# Patient Record
Sex: MALE | Race: ASIAN | NOT HISPANIC OR LATINO | URBAN - METROPOLITAN AREA
[De-identification: names, ages, dates, MRNs, and addresses within clinical notes are randomized per-mention and may not be internally consistent; named-entity substitution may affect disease eponyms.]

---

## 2017-07-19 ENCOUNTER — OUTPATIENT (OUTPATIENT)
Dept: OUTPATIENT SERVICES | Facility: HOSPITAL | Age: 52
LOS: 1 days | End: 2017-07-19

## 2017-07-19 DIAGNOSIS — Z00.00 ENCOUNTER FOR GENERAL ADULT MEDICAL EXAMINATION WITHOUT ABNORMAL FINDINGS: ICD-10-CM

## 2017-12-17 ENCOUNTER — EMERGENCY (EMERGENCY)
Facility: HOSPITAL | Age: 52
LOS: 1 days | Discharge: HOME | End: 2017-12-17
Attending: EMERGENCY MEDICINE | Admitting: EMERGENCY MEDICINE
Payer: COMMERCIAL

## 2017-12-17 VITALS
RESPIRATION RATE: 16 BRPM | TEMPERATURE: 98 F | OXYGEN SATURATION: 100 % | SYSTOLIC BLOOD PRESSURE: 125 MMHG | DIASTOLIC BLOOD PRESSURE: 84 MMHG | HEART RATE: 80 BPM

## 2017-12-17 PROCEDURE — 99284 EMERGENCY DEPT VISIT MOD MDM: CPT

## 2017-12-17 PROCEDURE — 73030 X-RAY EXAM OF SHOULDER: CPT | Mod: 26,RT

## 2017-12-17 PROCEDURE — 73030 X-RAY EXAM OF SHOULDER: CPT

## 2017-12-17 PROCEDURE — 99283 EMERGENCY DEPT VISIT LOW MDM: CPT | Mod: 25

## 2017-12-17 RX ORDER — KETOROLAC TROMETHAMINE 30 MG/ML
10 SYRINGE (ML) INJECTION ONCE
Qty: 0 | Refills: 0 | Status: DISCONTINUED | OUTPATIENT
Start: 2017-12-17 | End: 2017-12-17

## 2017-12-17 RX ADMIN — Medication 10 MILLIGRAM(S): at 18:09

## 2017-12-17 RX ADMIN — Medication 10 MILLIGRAM(S): at 18:10

## 2017-12-17 NOTE — ED PROVIDER NOTE - PROGRESS NOTE DETAILS
Patient was evaluated by me, found in no acute distress, calm, speaking in complete sentences. X-rays reveal no acute fracture, dislocation, or bony deformity, official report not available. Recommendations for rest and follow-up with PMD were given to patient. Patient is stable for discharge. Will discharge home. I agree with the NP's assessment and plan. Dr. Damien Perez

## 2017-12-17 NOTE — ED ADULT NURSE NOTE - OBJECTIVE STATEMENT
51 y/o male A&Ox4, presents to ED with c/o worsening right shoulder pain x1 week. 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 Pt rates pain 0/10 with no movement, rates pain 5/10 with movement. Pt is otherwise well appearing, alert and ambulatory. Has no other complaints at this time.

## 2017-12-17 NOTE — ED PROVIDER NOTE - PLAN OF CARE
Take ibuprofen 600 mg every 8 hours as needed for pain with food and plenty of water  Rest, ice or heat packs as needed for discomfort.  Follow up with your primary doctor within the next 1-2 days  If you have any worsening or changing symptoms such as confusion, loss of consciousness, worsening pain, nausea/vomiting, or if you have any other concerns please return to the emergency department

## 2017-12-17 NOTE — ED PROVIDER NOTE - MEDICAL DECISION MAKING DETAILS
Dr. Perez: Male patient with atraumatic right shoulder pain, with painful ROM. X-ray ordered to assess for fracture, dislocation, bony deformity.

## 2017-12-17 NOTE — ED PROVIDER NOTE - PHYSICAL EXAMINATION
NAD, VSS, Afebrile, No spinal tender, + right shoulder laterally tender with diminished  ROMs, N/V- intact, no deformity. NAD, VSS, Afebrile, No spinal tender, + right shoulder laterally tender with diminished  ROMs, N/V- intact, no deformity.    Dr. Perez: Gen: AAOX3, in no acute distress, speaking in complete sentences, cooperative with examination  Ext: no edema or cyanosis, right shoulder pain with abduction, positive empty can test, no gross deformities, no swelling, right distal radial and ulnar pulses 2+  Neuro: no gross deficits

## 2017-12-17 NOTE — ED PROVIDER NOTE - OBJECTIVE STATEMENT
53yo male pt, ambulatory c/o right dominant shoulder pain with sudden onset since 5pm. Described pain as sharp needle pain with movement. Denies injuries. Denies sensory changes or weakness to extremities. Denies CP/SOB/ABD pain or N/V/D.

## 2017-12-17 NOTE — ED PROVIDER NOTE - CARE PLAN
Principal Discharge DX:	Shoulder pain, right Principal Discharge DX:	Shoulder pain, right  Instructions for follow-up, activity and diet:	Take ibuprofen 600 mg every 8 hours as needed for pain with food and plenty of water  Rest, ice or heat packs as needed for discomfort.  Follow up with your primary doctor within the next 1-2 days  If you have any worsening or changing symptoms such as confusion, loss of consciousness, worsening pain, nausea/vomiting, or if you have any other concerns please return to the emergency department

## 2017-12-17 NOTE — ED ADULT NURSE NOTE - CHPI ED SYMPTOMS NEG
no deformity/no fever/no numbness/no difficulty bearing weight/no weakness/no abrasion/no back pain/no tingling/no stiffness/no bruising

## 2020-04-25 ENCOUNTER — MESSAGE (OUTPATIENT)
Age: 55
End: 2020-04-25

## 2020-05-04 LAB
SARS-COV-2 IGG SERPL IA-ACNC: 0.1 INDEX
SARS-COV-2 IGG SERPL QL IA: NEGATIVE

## 2020-12-02 NOTE — ED PROVIDER NOTE - CROS ED EYES ALL NEG
Left detailed message for pt with provider recommendations.  PLEASE ASSIST WITH SCHEDULING CPE WHEN SHE CALLS BACK.   negative...

## 2021-04-24 NOTE — ED ADULT NURSE NOTE - NS ED NURSE LEVEL OF CONSCIOUSNESS AFFECT
[de-identified] : No other concerns, here to follow up results. [FreeTextEntry6] : Pt. here for f/u for abdominal pain. Had abdominal xray that showed marked amount of stool throughout colon including rectum and retrosigmoid. Findings c/w E.coli. Parents report he is having dry/hard BMS. No longer having abdominal pain.  Calm

## 2023-12-05 ENCOUNTER — APPOINTMENT (OUTPATIENT)
Dept: HEPATOLOGY | Facility: CLINIC | Age: 58
End: 2023-12-05

## 2024-01-06 DIAGNOSIS — K75.4 AUTOIMMUNE HEPATITIS: ICD-10-CM

## 2024-01-09 RX ORDER — TENOFOVIR ALAFENAMIDE 25 MG/1
25 TABLET ORAL
Qty: 30 | Refills: 11 | Status: ACTIVE | COMMUNITY
Start: 2024-01-06 | End: 1900-01-01

## 2024-01-23 ENCOUNTER — APPOINTMENT (OUTPATIENT)
Dept: HEPATOLOGY | Facility: CLINIC | Age: 59
End: 2024-01-23
Payer: COMMERCIAL

## 2024-01-23 VITALS
BODY MASS INDEX: 25.7 KG/M2 | HEART RATE: 90 BPM | SYSTOLIC BLOOD PRESSURE: 125 MMHG | HEIGHT: 68 IN | DIASTOLIC BLOOD PRESSURE: 85 MMHG | WEIGHT: 169.6 LBS

## 2024-01-23 DIAGNOSIS — K74.02 HEPATIC FIBROSIS, ADVANCED FIBROSIS: ICD-10-CM

## 2024-01-23 DIAGNOSIS — B18.1 CHRONIC VIRAL HEPATITIS B W/OUT DELTA-AGENT: ICD-10-CM

## 2024-01-23 PROCEDURE — 99203 OFFICE O/P NEW LOW 30 MIN: CPT

## 2024-01-23 NOTE — HISTORY OF PRESENT ILLNESS
[de-identified] : Chronic hepatitis B  Advanced fibrosis  FH of liver disease   Patient feels ok No new event no NVD No rectal bleeding  No chest pain or SOB  No cough No dizziness or confusion No  symptoms   ROS otherwise negative   Meds reviewed  Labs and imaging reviewed   [FreeTextEntry1] : vs stable Clear lungs with no crackle or rhonchi  Normal cardiac exam with regular heartbeats and no murmur Soft abdomen with no tenderness, ascites or hernia No edema of extremities Awake alert and oriented

## 2024-01-23 NOTE — ASSESSMENT
[FreeTextEntry1] : Patient with chronic hepatitis B and history of advanced fibrosis Continue Vemlidy.  Importance of lifelong treatment with antiviral medicine have been discussed with him in details Records to be obtained from [facility] Had colonoscopy in approximately 6 years ago.  Colorectal cancer screening was discussed with him HCC screening was discussed with him and ultrasound was ordered Follow-up in 6-month

## 2024-01-25 LAB
ALBUMIN SERPL ELPH-MCNC: 4.7 G/DL
ALP BLD-CCNC: 69 U/L
ALT SERPL-CCNC: 21 U/L
ANION GAP SERPL CALC-SCNC: 14 MMOL/L
AST SERPL-CCNC: 17 U/L
BILIRUB SERPL-MCNC: 0.5 MG/DL
BUN SERPL-MCNC: 16 MG/DL
CALCIUM SERPL-MCNC: 9.8 MG/DL
CHLORIDE SERPL-SCNC: 102 MMOL/L
CO2 SERPL-SCNC: 25 MMOL/L
CREAT SERPL-MCNC: 0.81 MG/DL
EGFR: 101 ML/MIN/1.73M2
GLUCOSE SERPL-MCNC: 117 MG/DL
HBV DNA # SERPL NAA+PROBE: 20 IU/ML
HCT VFR BLD CALC: 51.1 %
HEPATITIS A IGG ANTIBODY: REACTIVE
HEPB DNA PCR INT: DETECTED
HEPB DNA PCR LOG: 1.29 LOGIU/ML
HGB BLD-MCNC: 16.5 G/DL
MCHC RBC-ENTMCNC: 29 PG
MCHC RBC-ENTMCNC: 32.3 GM/DL
MCV RBC AUTO: 89.8 FL
PLATELET # BLD AUTO: 307 K/UL
POTASSIUM SERPL-SCNC: 4.8 MMOL/L
PROT SERPL-MCNC: 7.2 G/DL
RBC # BLD: 5.69 M/UL
RBC # FLD: 12.7 %
SODIUM SERPL-SCNC: 141 MMOL/L
WBC # FLD AUTO: 8.05 K/UL

## 2024-02-01 ENCOUNTER — RESULT REVIEW (OUTPATIENT)
Age: 59
End: 2024-02-01

## 2024-02-05 LAB
ALPHA-1-FETOPROTEIN-L3: NORMAL %
ALPHA-1-FETOPROTEIN: 3.2 NG/ML

## 2025-01-13 PROBLEM — R63.4 WEIGHT LOSS: Status: ACTIVE | Noted: 2025-01-13

## 2025-02-11 ENCOUNTER — APPOINTMENT (OUTPATIENT)
Dept: HEPATOLOGY | Facility: CLINIC | Age: 60
End: 2025-02-11

## 2025-02-11 VITALS
HEIGHT: 68 IN | WEIGHT: 160 LBS | BODY MASS INDEX: 24.25 KG/M2 | SYSTOLIC BLOOD PRESSURE: 117 MMHG | HEART RATE: 81 BPM | DIASTOLIC BLOOD PRESSURE: 78 MMHG

## 2025-02-11 DIAGNOSIS — R63.4 ABNORMAL WEIGHT LOSS: ICD-10-CM

## 2025-02-11 DIAGNOSIS — B18.1 CHRONIC VIRAL HEPATITIS B W/OUT DELTA-AGENT: ICD-10-CM

## 2025-02-11 DIAGNOSIS — K74.02 HEPATIC FIBROSIS, ADVANCED FIBROSIS: ICD-10-CM

## 2025-02-11 PROCEDURE — 99214 OFFICE O/P EST MOD 30 MIN: CPT

## 2025-02-11 PROCEDURE — G2211 COMPLEX E/M VISIT ADD ON: CPT

## 2025-02-22 RX ORDER — SODIUM SULFATE, POTASSIUM SULFATE AND MAGNESIUM SULFATE 1.6; 3.13; 17.5 G/177ML; G/177ML; G/177ML
17.5-3.13-1.6 SOLUTION ORAL
Qty: 2 | Refills: 0 | Status: ACTIVE | COMMUNITY
Start: 2025-02-22 | End: 1900-01-01

## 2025-03-05 ENCOUNTER — RESULT REVIEW (OUTPATIENT)
Age: 60
End: 2025-03-05

## 2025-03-14 ENCOUNTER — NON-APPOINTMENT (OUTPATIENT)
Age: 60
End: 2025-03-14

## 2025-03-17 ENCOUNTER — NON-APPOINTMENT (OUTPATIENT)
Age: 60
End: 2025-03-17

## 2025-03-26 ENCOUNTER — APPOINTMENT (OUTPATIENT)
Dept: HEPATOLOGY | Facility: HOSPITAL | Age: 60
End: 2025-03-26

## 2025-03-26 ENCOUNTER — OUTPATIENT (OUTPATIENT)
Dept: OUTPATIENT SERVICES | Facility: HOSPITAL | Age: 60
LOS: 1 days | Discharge: HOME | End: 2025-03-26
Payer: COMMERCIAL

## 2025-03-26 ENCOUNTER — RESULT REVIEW (OUTPATIENT)
Age: 60
End: 2025-03-26

## 2025-03-26 PROCEDURE — 43239 EGD BIOPSY SINGLE/MULTIPLE: CPT

## 2025-03-26 PROCEDURE — 45378 DIAGNOSTIC COLONOSCOPY: CPT

## 2025-03-26 PROCEDURE — 88305 TISSUE EXAM BY PATHOLOGIST: CPT

## 2025-03-26 PROCEDURE — G0121: CPT

## 2025-03-26 PROCEDURE — 88305 TISSUE EXAM BY PATHOLOGIST: CPT | Mod: 26

## 2025-04-03 ENCOUNTER — NON-APPOINTMENT (OUTPATIENT)
Age: 60
End: 2025-04-03

## 2025-05-06 ENCOUNTER — INPATIENT (INPATIENT)
Facility: HOSPITAL | Age: 60
LOS: 0 days | Discharge: HOME | DRG: 123 | End: 2025-05-07
Attending: INTERNAL MEDICINE | Admitting: INTERNAL MEDICINE
Payer: COMMERCIAL

## 2025-05-06 VITALS
SYSTOLIC BLOOD PRESSURE: 128 MMHG | HEART RATE: 88 BPM | DIASTOLIC BLOOD PRESSURE: 89 MMHG | RESPIRATION RATE: 19 BRPM | OXYGEN SATURATION: 100 %

## 2025-05-06 DIAGNOSIS — H53.2 DIPLOPIA: ICD-10-CM

## 2025-05-06 LAB
ALBUMIN SERPL ELPH-MCNC: 4.2 G/DL — SIGNIFICANT CHANGE UP (ref 3.3–5)
ALP SERPL-CCNC: 66 U/L — SIGNIFICANT CHANGE UP (ref 40–120)
ALT FLD-CCNC: 21 U/L — SIGNIFICANT CHANGE UP (ref 10–45)
ANION GAP SERPL CALC-SCNC: 12 MMOL/L — SIGNIFICANT CHANGE UP (ref 5–17)
APTT BLD: 33.3 SEC — SIGNIFICANT CHANGE UP (ref 26.1–36.8)
AST SERPL-CCNC: 17 U/L — SIGNIFICANT CHANGE UP (ref 10–40)
BASOPHILS # BLD AUTO: 0.05 K/UL — SIGNIFICANT CHANGE UP (ref 0–0.2)
BASOPHILS NFR BLD AUTO: 0.5 % — SIGNIFICANT CHANGE UP (ref 0–2)
BILIRUB SERPL-MCNC: 0.7 MG/DL — SIGNIFICANT CHANGE UP (ref 0.2–1.2)
BUN SERPL-MCNC: 11 MG/DL — SIGNIFICANT CHANGE UP (ref 7–23)
CALCIUM SERPL-MCNC: 9.2 MG/DL — SIGNIFICANT CHANGE UP (ref 8.4–10.5)
CHLORIDE SERPL-SCNC: 100 MMOL/L — SIGNIFICANT CHANGE UP (ref 96–108)
CO2 SERPL-SCNC: 25 MMOL/L — SIGNIFICANT CHANGE UP (ref 22–31)
CREAT SERPL-MCNC: 0.78 MG/DL — SIGNIFICANT CHANGE UP (ref 0.5–1.3)
EGFR: 101 ML/MIN/1.73M2 — SIGNIFICANT CHANGE UP
EGFR: 101 ML/MIN/1.73M2 — SIGNIFICANT CHANGE UP
EOSINOPHIL # BLD AUTO: 0.12 K/UL — SIGNIFICANT CHANGE UP (ref 0–0.5)
EOSINOPHIL NFR BLD AUTO: 1.3 % — SIGNIFICANT CHANGE UP (ref 0–6)
GAS PNL BLDV: SIGNIFICANT CHANGE UP
GLUCOSE SERPL-MCNC: 170 MG/DL — HIGH (ref 70–99)
HCT VFR BLD CALC: 49 % — SIGNIFICANT CHANGE UP (ref 39–50)
HGB BLD-MCNC: 16.5 G/DL — SIGNIFICANT CHANGE UP (ref 13–17)
IMM GRANULOCYTES NFR BLD AUTO: 0.7 % — SIGNIFICANT CHANGE UP (ref 0–0.9)
INR BLD: 1.07 RATIO — SIGNIFICANT CHANGE UP (ref 0.85–1.16)
LYMPHOCYTES # BLD AUTO: 2.46 K/UL — SIGNIFICANT CHANGE UP (ref 1–3.3)
LYMPHOCYTES # BLD AUTO: 26.8 % — SIGNIFICANT CHANGE UP (ref 13–44)
MCHC RBC-ENTMCNC: 29.6 PG — SIGNIFICANT CHANGE UP (ref 27–34)
MCHC RBC-ENTMCNC: 33.7 G/DL — SIGNIFICANT CHANGE UP (ref 32–36)
MCV RBC AUTO: 88 FL — SIGNIFICANT CHANGE UP (ref 80–100)
MONOCYTES # BLD AUTO: 0.61 K/UL — SIGNIFICANT CHANGE UP (ref 0–0.9)
MONOCYTES NFR BLD AUTO: 6.6 % — SIGNIFICANT CHANGE UP (ref 2–14)
NEUTROPHILS # BLD AUTO: 5.88 K/UL — SIGNIFICANT CHANGE UP (ref 1.8–7.4)
NEUTROPHILS NFR BLD AUTO: 64.1 % — SIGNIFICANT CHANGE UP (ref 43–77)
NRBC BLD AUTO-RTO: 0 /100 WBCS — SIGNIFICANT CHANGE UP (ref 0–0)
PLATELET # BLD AUTO: 257 K/UL — SIGNIFICANT CHANGE UP (ref 150–400)
POTASSIUM SERPL-MCNC: 3.7 MMOL/L — SIGNIFICANT CHANGE UP (ref 3.5–5.3)
POTASSIUM SERPL-SCNC: 3.7 MMOL/L — SIGNIFICANT CHANGE UP (ref 3.5–5.3)
PROT SERPL-MCNC: 7.2 G/DL — SIGNIFICANT CHANGE UP (ref 6–8.3)
PROTHROM AB SERPL-ACNC: 12.2 SEC — SIGNIFICANT CHANGE UP (ref 9.9–13.4)
RBC # BLD: 5.57 M/UL — SIGNIFICANT CHANGE UP (ref 4.2–5.8)
RBC # FLD: 12.2 % — SIGNIFICANT CHANGE UP (ref 10.3–14.5)
SODIUM SERPL-SCNC: 137 MMOL/L — SIGNIFICANT CHANGE UP (ref 135–145)
TROPONIN T, HIGH SENSITIVITY RESULT: <6 NG/L — SIGNIFICANT CHANGE UP (ref 0–51)
WBC # BLD: 9.18 K/UL — SIGNIFICANT CHANGE UP (ref 3.8–10.5)
WBC # FLD AUTO: 9.18 K/UL — SIGNIFICANT CHANGE UP (ref 3.8–10.5)

## 2025-05-06 PROCEDURE — 70450 CT HEAD/BRAIN W/O DYE: CPT | Mod: 26,59

## 2025-05-06 PROCEDURE — 99291 CRITICAL CARE FIRST HOUR: CPT | Mod: GC

## 2025-05-06 PROCEDURE — 70496 CT ANGIOGRAPHY HEAD: CPT | Mod: 26

## 2025-05-06 PROCEDURE — 99285 EMERGENCY DEPT VISIT HI MDM: CPT

## 2025-05-06 PROCEDURE — 70498 CT ANGIOGRAPHY NECK: CPT | Mod: 26

## 2025-05-06 PROCEDURE — 93010 ELECTROCARDIOGRAM REPORT: CPT

## 2025-05-06 NOTE — H&P ADULT - ASSESSMENT
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    ====Neurological====  #Diplopia  #CN IV palsy  -Intermittent/fluctuating diplopia x 1 week, improves with covering one eye, no pain. NIHSS 1 in ED for CN IV palsy on neuro exam.   -CTA H/N unremarkable: No flow limiting stenosis, no aneurysm identified.  Plan: MRI brain w and wo contrast to rule out CNS etiology             MRI orbits w and wo contrast to look for local pathology              TTE with bubble study to eval for intracardiac shunt                               ====Respiratory====  -Saturating well on RA  No active issues.    ====Cardiovascular====  -Trop negative, EKG: sinus rhythm, qtc wnl   No active issues.    ====Renal====  -Cr 0.78  No active issues.    ====GI====  #Chronic Hepatitis B  -Home med: Vemlidy 25mg  -LFT's wnl  Plan: c/w home Vemlidy    ====Endocrine====  -Glucose wnl  Plan: monitor glucose on BMP    ====Heme====  -CBC wnl   Plan: DVT ppx Lovenox 40mg Sq daily             Monitor CBC    ====ID====  -Afebrile, VSS, WBC wnl, no concerns for active infection.     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    ====Neurological====  #Diplopia  #CN IV palsy  -Intermittent/fluctuating diplopia x 1 week, improves with covering one eye, no pain. NIHSS 1 in ED for CN IV palsy on neuro exam.   -CTA H/N unremarkable: No flow limiting stenosis, no aneurysm identified.  -Passed bedside dysphagia screen  Plan: MRI brain w and wo contrast, consider antithrombotics based on MR results             MRI orbits w and wo contrast             TTE with bubble study to eval for intracardiac shunt             PT/OT             Eye patch                      ====Respiratory====  -Saturating well on RA  No active issues.    ====Cardiovascular====  -Trop negative, EKG: sinus rhythm, QTc normal  Plan: BP goal <140/90             f/u Lipid panel    ====Renal====  -Cr 0.78  No active issues.    ====GI====  #Chronic Hepatitis B  -Home med: Vemlidy 25mg  -LFT's wnl  Plan: c/w home Vemlidy    ====Endocrine====  -Glucose wnl  Plan: monitor glucose on BMP             Goal 140-180 inpatient    ====Heme====  -CBC wnl   Plan: DVT ppx Lovenox 40mg Sq daily             Monitor CBC    ====ID====  -Afebrile, VSS, WBC wnl, no concerns for active infection.     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    ====Neurological====  #Diplopia  #CN IV palsy  -Intermittent/fluctuating diplopia x 1 week, improves with covering one eye, no pain. NIHSS 1 in ED for CN IV palsy on neuro exam.   -CTA H/N unremarkable: No flow limiting stenosis, no aneurysm identified.  -Passed bedside dysphagia screen  Plan: MRI brain w and wo contrast, consider antithrombotics based on MR results             MRI orbits w and wo contrast             TTE with bubble study to eval for intracardiac shunt             PT/OT             Eye patch                      ====Respiratory====  -Saturating well on RA  No active issues.    ====Cardiovascular====  -Trop negative, EKG: sinus rhythm, QTc normal  Plan: BP goal <140/90             f/u Lipid panel    ====Renal====  -Cr 0.78  No active issues.    ====GI====  #Chronic Hepatitis B  -Home med: Vemlidy 25mg  -LFT's wnl  Plan: c/w home Vemlidy    ====Endocrine====  -Glucose wnl  Plan: monitor glucose on BMP             Goal 140-180 inpatient             f/u Hb A1c    ====Heme====  -CBC wnl   Plan: DVT ppx Lovenox 40mg Sq daily             Monitor CBC    ====ID====  -Afebrile, VSS, WBC wnl, no concerns for active infection.     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    ====Neurological====  #Diplopia  #CN IV palsy  -Intermittent/fluctuating diplopia x 1 week, improves with covering one eye, no pain. NIHSS 1 in ED for CN IV palsy on neuro exam.   -CTA H/N unremarkable: No flow limiting stenosis, no aneurysm identified.  -Passed bedside dysphagia screen  Plan: MRI brain w and wo contrast, consider antithrombotics based on MR results             MRI orbits w and wo contrast             TTE with bubble study to eval for intracardiac shunt             PT/OT             Eye patch                      ====Respiratory====  -Saturating well on RA  No active issues.    ====Cardiovascular====  -Trop negative, EKG: sinus rhythm, QTc normal  Plan: BP goal <140/90             f/u Lipid panel    ====Renal====  -Cr 0.78  No active issues.    ====GI====  #Chronic Hepatitis B  -Home med: Vemlidy 25mg  -LFT's wnl, prior screening MRI's negative for any suspicious lesions.  Plan: c/w home Vemlidy    ====Endocrine====  -Glucose wnl  Plan: monitor glucose on BMP             Goal 140-180 inpatient             f/u Hb A1c    ====Heme====  -CBC wnl   Plan: DVT ppx Lovenox 40mg Sq daily             Monitor CBC    ====ID====  -Afebrile, VSS, WBC wnl, no concerns for active infection.     ====Lines====  -PIV    ====GOC====  -Full Code

## 2025-05-06 NOTE — ED ADULT NURSE NOTE - OBJECTIVE STATEMENT
59 y.o male c/o double vision and dizziness. Endorses intermittent double vision x 1 week, vision corrects only when one eye is covered. States dizziness and double vision worsened tonight over the past 1hr. Denies HA, room spinning sensation, NV, CP, SOB, numbness/weakness at this time. PMH chronic hepatitis B

## 2025-05-06 NOTE — CONSULT NOTE ADULT - ASSESSMENT
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 Double vision is with both eyes open, resolves with either eyes closed. Sees images one above the other. No blurry vision. 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 He denies any speech changes, numbness or weakness. He denies any headache, eye pain but has mild neck strain for last 2 hours. He denies any room spinning dizziness. Denies any head or neck trauma. On exam, VSS, right eye hypertropic, diplopia worsens on left gaze and right head tilt (RSO paresis), gait unsteady with both eyes open, normal with one eye closed, rest of the exam unremarkable.     LKW 1 week ago  MRS 0  NIHSS 1(mild gaze paresis)  out of window for TNK, thrombectomy    Impression: Binocular diplopia w possible R superior oblique paresis could be R CN 4 palsy. Etiology could be microvascular vs local compression    Recs:    [] MRI brain w and wo contrast to rule out CNS etiology  [] MRI orbits w and wo contrast to look for local pathology   [] TTE with bubble study and telemetry  [] HbA1C and Lipid Panel  [] would consider antithrombotics based on MRI    Other:  [] Telemonitoring;  Neurochecks and Vital signs q4h  [] BP goal <140/90  [] BGM goals 140-180    [] NPO until clears Dysphagia screen, otherwise Swallow evaluation  [] DVT prophylaxis  [] PT/OT evaluation  [] Eye patch    [] Stroke education provided    Case discussed w stroke fellow Dr. Fountain under the supervision of Dr. Walker a

## 2025-05-06 NOTE — ED PROVIDER NOTE - CLINICAL SUMMARY MEDICAL DECISION MAKING FREE TEXT BOX
Ganesh saw her for 59-year-old male history of chronic hepatitis B presenting for diplopia and dizziness.  Patient states intermittent double vision for 1 week acutely worsening tonight.  States sensation of diplopia.  States vision is only corrected if he covers 1 eye.  States he feels unsteady.  Denies sensation of room spinning, nausea and vomiting.  Denies any syncope.  Denies blood thinner use.  Denies fevers chills chest pain shortness of breath nausea vomiting diarrhea.  No prior history of stroke or TIA.    Chencho Freeman DO (PGY3)   Physical Exam:    Gen: NAD, AOx3  Head: NCAT  HEENT: EOMI, PEERLA  Lung: CTAB  CV: RRR  Abd: soft, NT, ND, no guarding, no rigidity, no rebound tenderness, no CVA tenderness   MSK: no visible deformities, ROM normal in UE/LE, no midline ttp to entire spine, pelvis stable  Neuro: No focal sensory or motor deficits. Sensation intact to light touch all extremities.  Skin: Warm, well perfused, no rash, no leg swelling  Psych: normal affect    Vital signs stable, afebrile, not hypoxic. Code stroke activated for diplopia and unsteadiness.  Plan for labs, CT imaging, neurology evaluation  Differential diagnosis includes but not limited to TIA versus LVO versus infectious etiology rest metabolic derangement.  Final dispo pending labs imaging close reassessment. 59-year-old male history of chronic hepatitis B presenting for diplopia and dizziness.  Patient states intermittent double vision for 1 week acutely worsening tonight approx 1 hour ago.  States sensation of diplopia that is only corrected if he covers 1 eye.  States he feels unsteady.  Denies sensation of room spinning, nausea and vomiting.  Denies any syncope.  Denies blood thinner use.  Denies fevers chills chest pain shortness of breath nausea vomiting diarrhea.  No prior history of stroke or TIA.     Chencho Freeman DO (PGY3)   Physical Exam:    Gen: NAD, AOx3  Head: NCAT  HEENT: EOMI, PEERLA  Lung: CTAB  CV: RRR  Abd: soft, NT, ND, no guarding, no rigidity, no rebound tenderness, no CVA tenderness   MSK: no visible deformities, ROM normal in UE/LE, no midline ttp to entire spine, pelvis stable  Neuro: No focal sensory or motor deficits. Sensation intact to light touch all extremities.   Skin: Warm, well perfused, no rash, no leg swelling  Psych: normal affect    Vital signs stable, afebrile, not hypoxic. Code stroke activated for diplopia and unsteadiness.  Plan for labs, CT imaging, neurology evaluation  Differential diagnosis includes but not limited to TIA versus LVO versus infectious etiology versus metabolic derangement.  Final dispo pending labs imaging close reassessment.

## 2025-05-06 NOTE — STROKE CODE NOTE - NIH STROKE SCALE: 4. FACIAL PALSY, QM
Patient presents in office today as new patient.   Patient concerns:  Gym injury from lifting weights 11/30/2019 (0) Normal symmetrical movements

## 2025-05-06 NOTE — H&P ADULT - NSHPLABSRESULTS_GEN_ALL_CORE
LABS: When present labs, imaging, and ECG were personally reviewed                          16.5   9.18  )-----------( 257      ( 06 May 2025 21:14 )             49.0       05-06    137  |  100  |  11  ----------------------------<  170[H]  3.7   |  25  |  0.78    Ca    9.2      06 May 2025 21:14    TPro  7.2  /  Alb  4.2  /  TBili  0.7  /  DBili  x   /  AST  17  /  ALT  21  /  AlkPhos  66  05-06       LIVER FUNCTIONS - ( 06 May 2025 21:14 )  Alb: 4.2 g/dL / Pro: 7.2 g/dL / ALK PHOS: 66 U/L / ALT: 21 U/L / AST: 17 U/L / GGT: x                    Urinalysis Basic - ( 06 May 2025 21:14 )    Color: x / Appearance: x / SG: x / pH: x  Gluc: 170 mg/dL / Ketone: x  / Bili: x / Urobili: x   Blood: x / Protein: x / Nitrite: x   Leuk Esterase: x / RBC: x / WBC x   Sq Epi: x / Non Sq Epi: x / Bacteria: x        PT/INR - ( 06 May 2025 21:14 )   PT: 12.2 sec;   INR: 1.07 ratio         PTT - ( 06 May 2025 21:14 )  PTT:33.3 sec    Lactate Trend            CAPILLARY BLOOD GLUCOSE                RADIOLOGY & ADDITIONAL TESTS:

## 2025-05-06 NOTE — H&P ADULT - ATTENDING COMMENTS
Attending Attestation:    Patient seen and examined with resident/fellow.  Agree with above except as noted.     58 yo male with h/o chronic hepatitis B  on Vemlidy , presents with worsening diplopia this evening. Pt has had worsening symptoms of diplopia for 7y-10 days.  Pt could not see and was unstable standing.  Pt has been c/o neck pain because of moving his neck in better position to see normally. When patient closes L eye he has no diplopia. . No nausea or vomiting. Pt denies any other symptoms such as leg or arm weakness. No dysarthria.      PE. Pt lying in bed in NAD /92  HEENT sclera anicteric   Neck: No JVD  Lungs clear to a   COr s1s2 wnl  ABD soft non tender  ext: -cce  Neuro: no eye weakness on my exam. Non focal exam. Good arm and leg strength.    CT head without signs of bleeding or stroke.  CTA head Neck. WNL    A/P59 yo male with chronic hepatitis B with severe diplopia.  Central CNS defect vs  specific opthalmic defect.  1. MRI head  2MRI orbit  3. Follow neuro exam closely  4Continue Vemlidy for chronic hepatitis B  5. Lovenox for DVT prophylaxis  GOC; Full code. Attending Attestation:    Patient seen and examined with resident/fellow.  Agree with above except as noted.     60 yo male with h/o chronic hepatitis B  on Vemlidy , presents with worsening diplopia this evening. Pt has had worsening symptoms of diplopia for 7-10 days.  Pt could not see and was unstable standing.  Pt has been c/o neck pain because of moving his neck in better position to see normally. When patient closes L eye he has no diplopia. . No nausea or vomiting. Pt denies any other symptoms such as leg or arm weakness. No dysarthria.      PE. Pt lying in bed in NAD /92  HEENT sclera anicteric   Neck: No JVD  Lungs clear to a   COr s1s2 wnl  ABD soft non tender  ext: -cce  Neuro: no eye weakness on my exam. Non focal exam. Good arm and leg strength.    CT head without signs of bleeding or stroke.  CTA head Neck. WNL    A/P59 yo male with chronic hepatitis B with severe diplopia.  Central CNS defect vs  specific opthalmic defect.  1. MRI head  2MRI orbit  3. Follow neuro exam closely  4Continue Vemlidy for chronic hepatitis B  5. Lovenox for DVT prophylaxis  6.GOC; Full code.  7. L eye patch

## 2025-05-06 NOTE — ED ADULT NURSE NOTE - NSFALLRISKINTERV_ED_ALL_ED

## 2025-05-06 NOTE — H&P ADULT - HISTORY OF PRESENT ILLNESS
58 y/o M PMH chronic Hep B, presented to [facility] c/o intermittent diplopia x 1 week, worsening tonight x 2 hours associated with feeling unsteady with walking and difficult focusing on computer screen. His double vision resolves with closing one eye, described as seeing images one above the other. He denies any speech changes, numbness, weakness, headache, eye pain, or vertigo.     In the ED code stroke was activated, he was NIHSS 1 for R eye gaze paresis noted on exam, suspected CN IV palsy, CTA H/N unremarkable.      60 y/o M PMH chronic Hep B, presented to [facility] c/o intermittent diplopia x 1 week, worsening tonight x 2 hours associated with feeling unsteady with walking and difficult focusing on computer screen. His double vision resolves with closing one eye, described as seeing images one above the other. He denies any fever, speech changes, numbness, weakness, headache, eye pain, tick bites, rashes, vomiting, or vertigo.     In the ED code stroke was activated, he was NIHSS 1 for R eye gaze paresis noted on exam, suspected CN IV palsy, CTA H/N unremarkable.

## 2025-05-06 NOTE — CONSULT NOTE ADULT - SUBJECTIVE AND OBJECTIVE BOX
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 Double vision is with both eyes open, resolves with either eyes closed. Sees images one above the other. No blurry vision. 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 He denies any speech changes, numbness or weakness. He denies any headache, eye pain but has mild neck strain for last 2 hours. He denies any room spinning dizziness.     PAST MEDICAL & SURGICAL HISTORY:      FAMILY HISTORY:      SOCIAL HISTORY:  Smoking Cessation:    MEDICATIONS  (STANDING):    MEDICATIONS  (PRN):      Allergies    No Known Allergies    Intolerances        --------------------------------------------------------------------------------------------------------------------------------------------------------------------------------------------------------------------    Vital Signs Last 24 Hrs  T(C): 36.8 (06 May 2025 21:30), Max: 36.8 (06 May 2025 21:30)  T(F): 98.2 (06 May 2025 21:30), Max: 98.2 (06 May 2025 21:30)  HR: 90 (06 May 2025 21:30) (88 - 90)  BP: 147/103 (06 May 2025 21:30) (128/89 - 147/103)  BP(mean): --  RR: 19 (06 May 2025 21:30) (19 - 19)  SpO2: 98% (06 May 2025 21:30) (98% - 100%)    Parameters below as of 06 May 2025 21:30  Patient On (Oxygen Delivery Method): room air        Fingerstick Blood Glucose: CAPILLARY BLOOD GLUCOSE          PHYSICAL EXAM:     General - NAD  Cardiovascular - Peripheral pulses palpable, no edema    NEURO:    Mental status - Awake, Alert, Oriented to person, place, and time. Speech fluent, repetition and naming intact. Follows simple and complex commands.    Cranial nerves -   PERRL, VFF,    right eye hypertropic  diplopia worsens on looking to left and tilting head to right  face sensation (V1-V3) intact b/l,   facial strength intact without asymmetry b/l,   hearing intact b/l,   palate with symmetric elevation,   trapezius 5/5 strength b/l,   tongue midline on protrusion with full lateral movement    Motor - Normal bulk and tone throughout. No pronator drift.  Strength testing            Deltoid      Biceps      Triceps     Wrist Extension    Wrist Flexion     Interossei         R            5                 5               5                     5                              5                        5                 5  L             5                 5               5                     5                              5                        5                 5              Hip Flexion    Hip Extension    Knee Flexion    Knee Extension    Dorsiflexion    Plantar Flexion  R              5                           5                       5                           5                            5                          5  L              5                           5                        5                           5                            5                          5    Sensation - Light touch intact throughout    DTR's -             Biceps      Triceps     Brachioradialis      Patellar    Ankle    Toes/plantar response  R             2+             2+                  2+                       2+            2+                 Down  L              2+             2+                 2+                        2+           2+                 Down    Coordination - Finger to Nose intact b/l. Heel to Rowe intact b/l. No tremors appreciated    Gait and station - Normal casual gait w one ye closed. Stumbles either side with both eyes open.    ---------------------------------------------------------------------------------------------------------------------------------------------------------------------------------------------------------------------------    Labs:                        16.5   9.18  )-----------( 257      ( 06 May 2025 21:14 )             49.0     05-06    137  |  100  |  11  ----------------------------<  170[H]  3.7   |  25  |  0.78    Ca    9.2      06 May 2025 21:14    TPro  7.2  /  Alb  4.2  /  TBili  0.7  /  DBili  x   /  AST  17  /  ALT  21  /  AlkPhos  66  05-06    CAPILLARY BLOOD GLUCOSE        LIVER FUNCTIONS - ( 06 May 2025 21:14 )  Alb: 4.2 g/dL / Pro: 7.2 g/dL / ALK PHOS: 66 U/L / ALT: 21 U/L / AST: 17 U/L / GGT: x             PT/INR - ( 06 May 2025 21:14 )   PT: 12.2 sec;   INR: 1.07 ratio         PTT - ( 06 May 2025 21:14 )  PTT:33.3 sec  CSF:                  Radiology:  < from: CT Brain Stroke Protocol (05.06.25 @ 21:27) >  IMPRESSION:  No acute intracranial hemorrhage, mass effect, or evidence of acute   vascular territorial infarction. If clinical symptoms persist or worsen,   more sensitiveevaluation with brain MRI may be obtained, if no   contraindications exist.    < end of copied text >    < from: CT Angio Brain Stroke Protocol  w/ IV Cont (05.06.25 @ 21:28) >  IMPRESSION:    CTA NECK:  No evidence of significant stenosis or occlusion.    CTA HEAD:  Patent intracranial circulation without flow limiting stenosis.  No evidence of aneurysm. Tiny aneurysms can be beyond the resolution of   CTA technique.    < end of copied text >

## 2025-05-06 NOTE — ED PROVIDER NOTE - PROGRESS NOTE DETAILS
Sandhya Burk MD, PGY3:  pt signed out to me pending neuro recs and admission. neuro recommends MRI for further eval, pt consents to admission

## 2025-05-06 NOTE — H&P ADULT - NSHPPHYSICALEXAM_GEN_ALL_CORE
T(C): 36.7 (05-06-25 @ 22:30), Max: 36.8 (05-06-25 @ 21:30)  HR: 75 (05-06-25 @ 22:30) (75 - 90)  BP: 132/94 (05-06-25 @ 22:30) (128/89 - 147/103)  RR: 17 (05-06-25 @ 22:30) (17 - 19)  SpO2: 98% (05-06-25 @ 22:30) (98% - 100%)    GENERAL: patient appears well, no acute distress, appropriate, pleasant  EYES: sclera clear, no exudates, pupils PERRL, EOM intact.   ENMT: oropharynx clear without erythema, no exudates, moist mucous membranes  NECK: supple, soft, no thyromegaly noted  LUNGS: good air entry bilaterally, clear to auscultation, symmetric breath sounds, no wheezing or rhonchi appreciated  HEART: soft S1/S2, regular rate and rhythm, no murmurs noted, no lower extremity edema  GASTROINTESTINAL: abdomen is soft, nontender, nondistended, normoactive bowel sounds, no palpable masses  INTEGUMENT: good skin turgor, no lesions noted  MUSCULOSKELETAL: no clubbing or cyanosis, no obvious deformity  NEUROLOGIC: awake, alert, oriented x3, good muscle tone in 4 extremities, 5/5 strength UE/LE bilaterally, sensation intact throughout. Gait normal. Finger to Nose intact. T(C): 36.7 (05-06-25 @ 22:30), Max: 36.8 (05-06-25 @ 21:30)  HR: 75 (05-06-25 @ 22:30) (75 - 90)  BP: 132/94 (05-06-25 @ 22:30) (128/89 - 147/103)  RR: 17 (05-06-25 @ 22:30) (17 - 19)  SpO2: 98% (05-06-25 @ 22:30) (98% - 100%)    GENERAL: patient appears well, no acute distress, appropriate, pleasant  EYES: sclera clear, no exudates, pupils PERRL, EOM intact.   ENMT: oropharynx clear without erythema, no exudates, moist mucous membranes  NECK: supple, soft, no JVD noted  LUNGS: good air entry bilaterally, clear to auscultation, symmetric breath sounds, no wheezing or rhonchi appreciated  HEART: S1/S2, regular rate and rhythm, no murmurs noted, no lower extremity edema  GASTROINTESTINAL: abdomen is soft, nontender, nondistended, normoactive bowel sounds, no palpable masses  INTEGUMENT: good skin turgor, no lesions noted  MUSCULOSKELETAL: no clubbing or cyanosis, no obvious deformity  NEUROLOGIC: awake, alert, oriented x3, good muscle tone in 4 extremities, 5/5 strength UE/LE bilaterally, sensation intact throughout. Finger to Nose intact.

## 2025-05-06 NOTE — ED PROVIDER NOTE - OBJECTIVE STATEMENT
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  No fever or chills.  No headache.  Code stroke initiated when patient presented to the emergency department.

## 2025-05-06 NOTE — ED PROVIDER NOTE - IV ALTEPLASE EXCL REL HIDDEN
Anesthesia Post-op Note    Patient: Britton Mendosa  Procedure(s) Performed: BILATERALLOWER LEG DEBRIDEMENT - BILATERAL  Anesthesia type: General    Vitals Value Taken Time   Temp 36.5 06/20/23 1004   Pulse 78 06/20/23 1002   Resp 16 06/20/23 1004   SpO2 100 % 06/20/23 1002   /68 06/20/23 1002   Vitals shown include unvalidated device data.      Patient Location: PACU Phase 1  Post-op Vital Signs:stable  Level of Consciousness: awake, participates in exam and return to baseline  Respiratory Status: spontaneous ventilation, face mask and unassisted  Cardiovascular blood pressure returned to baseline  Hydration: euvolemic  Pain Management: well controlled  Handoff: Handoff to receiving clinician was performed and questions were answered  Vomiting: none  Nausea: None  Airway Patency:patent  Post-op Assessment: awake, alert, appropriately conversant, or baseline, no complications, patient tolerated procedure well, no evidence of recall and dentition within defined limits  Comments: Handoff given to RN. JOSE MANUEL.      There were no known notable events for this encounter.  
show

## 2025-05-06 NOTE — H&P ADULT - NSHPREVIEWOFSYSTEMS_GEN_ALL_CORE
REVIEW OF SYSTEMS:    CONSTITUTIONAL: No weakness, fevers or chills  NECK: No pain or stiffness  RESPIRATORY: No cough, wheezing, hemoptysis; No shortness of breath  CARDIOVASCULAR: No chest pain or palpitations  GASTROINTESTINAL: No abdominal or epigastric pain. No nausea, vomiting, or hematemesis; No diarrhea or constipation. No melena or hematochezia.  GENITOURINARY: No dysuria, frequency or hematuria  SKIN: No itching, rashes

## 2025-05-07 ENCOUNTER — TRANSCRIPTION ENCOUNTER (OUTPATIENT)
Age: 60
End: 2025-05-07

## 2025-05-07 VITALS
HEART RATE: 74 BPM | RESPIRATION RATE: 20 BRPM | DIASTOLIC BLOOD PRESSURE: 80 MMHG | OXYGEN SATURATION: 95 % | SYSTOLIC BLOOD PRESSURE: 110 MMHG

## 2025-05-07 LAB
A1C WITH ESTIMATED AVERAGE GLUCOSE RESULT: 5.8 % — HIGH (ref 4–5.6)
ESTIMATED AVERAGE GLUCOSE: 120 MG/DL — HIGH (ref 68–114)

## 2025-05-07 PROCEDURE — 84132 ASSAY OF SERUM POTASSIUM: CPT

## 2025-05-07 PROCEDURE — 93005 ELECTROCARDIOGRAM TRACING: CPT

## 2025-05-07 PROCEDURE — 99285 EMERGENCY DEPT VISIT HI MDM: CPT | Mod: 25

## 2025-05-07 PROCEDURE — 82803 BLOOD GASES ANY COMBINATION: CPT

## 2025-05-07 PROCEDURE — 83605 ASSAY OF LACTIC ACID: CPT

## 2025-05-07 PROCEDURE — 70496 CT ANGIOGRAPHY HEAD: CPT | Mod: MC

## 2025-05-07 PROCEDURE — 85730 THROMBOPLASTIN TIME PARTIAL: CPT

## 2025-05-07 PROCEDURE — 84484 ASSAY OF TROPONIN QUANT: CPT

## 2025-05-07 PROCEDURE — 70543 MRI ORBT/FAC/NCK W/O &W/DYE: CPT | Mod: 26

## 2025-05-07 PROCEDURE — 85014 HEMATOCRIT: CPT

## 2025-05-07 PROCEDURE — 36415 COLL VENOUS BLD VENIPUNCTURE: CPT

## 2025-05-07 PROCEDURE — 83036 HEMOGLOBIN GLYCOSYLATED A1C: CPT

## 2025-05-07 PROCEDURE — 85025 COMPLETE CBC W/AUTO DIFF WBC: CPT

## 2025-05-07 PROCEDURE — A9585: CPT

## 2025-05-07 PROCEDURE — 84295 ASSAY OF SERUM SODIUM: CPT

## 2025-05-07 PROCEDURE — 85610 PROTHROMBIN TIME: CPT

## 2025-05-07 PROCEDURE — 70553 MRI BRAIN STEM W/O & W/DYE: CPT | Mod: 26

## 2025-05-07 PROCEDURE — 82330 ASSAY OF CALCIUM: CPT

## 2025-05-07 PROCEDURE — 70450 CT HEAD/BRAIN W/O DYE: CPT | Mod: MC

## 2025-05-07 PROCEDURE — 70498 CT ANGIOGRAPHY NECK: CPT | Mod: MC

## 2025-05-07 PROCEDURE — 99232 SBSQ HOSP IP/OBS MODERATE 35: CPT | Mod: GC

## 2025-05-07 PROCEDURE — 85018 HEMOGLOBIN: CPT

## 2025-05-07 PROCEDURE — 82435 ASSAY OF BLOOD CHLORIDE: CPT

## 2025-05-07 PROCEDURE — 70553 MRI BRAIN STEM W/O & W/DYE: CPT | Mod: MC

## 2025-05-07 PROCEDURE — 70543 MRI ORBT/FAC/NCK W/O &W/DYE: CPT | Mod: MC

## 2025-05-07 PROCEDURE — 82947 ASSAY GLUCOSE BLOOD QUANT: CPT

## 2025-05-07 PROCEDURE — 80053 COMPREHEN METABOLIC PANEL: CPT

## 2025-05-07 RX ORDER — ENOXAPARIN SODIUM 100 MG/ML
40 INJECTION SUBCUTANEOUS EVERY 24 HOURS
Refills: 0 | Status: DISCONTINUED | OUTPATIENT
Start: 2025-05-07 | End: 2025-05-07

## 2025-05-07 RX ORDER — TENOFOVIR DISOPROXIL FUMARATE 300 MG/1
25 TABLET, FILM COATED ORAL DAILY
Refills: 0 | Status: DISCONTINUED | OUTPATIENT
Start: 2025-05-07 | End: 2025-05-07

## 2025-05-07 RX ORDER — ACETAMINOPHEN 500 MG/5ML
650 LIQUID (ML) ORAL ONCE
Refills: 0 | Status: COMPLETED | OUTPATIENT
Start: 2025-05-07 | End: 2025-05-07

## 2025-05-07 RX ORDER — TENOFOVIR DISOPROXIL FUMARATE 300 MG/1
1 TABLET, FILM COATED ORAL
Refills: 0 | DISCHARGE

## 2025-05-07 RX ADMIN — Medication 650 MILLIGRAM(S): at 00:28

## 2025-05-07 RX ADMIN — Medication 650 MILLIGRAM(S): at 01:16

## 2025-05-07 RX ADMIN — TENOFOVIR DISOPROXIL FUMARATE 25 MILLIGRAM(S): 300 TABLET, FILM COATED ORAL at 12:37

## 2025-05-07 NOTE — DISCHARGE NOTE PROVIDER - TIME SPENT: (MINUTES SPENT ON THE DISCHARGE SERVICE)
Admission process complete. Patient ready for procedure. Plan of care reviewed with patient. Preoperative fasting appropriate for procedure and sedation. Call light in reach and bed in lowest position.    30

## 2025-05-07 NOTE — DISCHARGE NOTE PROVIDER - NSDCCPTREATMENT_GEN_ALL_CORE_FT
PRINCIPAL PROCEDURE  Procedure: CTA head w/w/o contrast  Findings and Treatment: CT angio   CTA HEAD:  INTERNAL CAROTID ARTERIES: Bilateral petrous, precavernous, cavernous,   and supraclinoid regions are patent without significant stenosis.  [place] OF GOMES: No aneurysm identified. Tiny aneurysms can be beyond   the resolution of CTA technique.  ANTERIOR CEREBRAL ARTERIES: No significant stenosis or occlusion.  MIDDLE CEREBRAL ARTERIES: No significant stenosis or occlusion.  POSTERIOR CEREBRAL ARTERIES: No significant stenosis or occlusion.  DISTAL VERTEBRAL / BASILAR ARTERIES: No significant stenosis or occlusion.  VENOUS STRUCTURES: Inadequate venous phase opacification.  MISCELLANEOUS: No other vascular abnormality is seen.  IMPRESSION:  CTA NECK:  No evidence of significant stenosis or occlusion.  CTA HEAD:  Patent intracranial circulation without flow limiting stenosis.  No evidence of aneurysm. Tiny aneurysms can be beyond the resolution of   CTA technique.      SECONDARY PROCEDURE  Procedure: MRI, head  Findings and Treatment:   MRI BRAIN: Multiple small rounded nonspecific foci of T2/FLAIR   hyperintensity are noted throughout the deep and periventricular white   matter of the cerebral hemispheres. There is no associated mass effect.   There is no evidence of acute ischemia on the diffusion-weighted images.  There is no abnormal brain parenchymal or leptomeningeal enhancement.  Ventricular size and configuration is unremarkable. Flow-voids are noted   throughout the major intracranial vessels, on the T2 weighted images,   consistent with their patency. The sellar region and posterior fossa   appear unremarkable.  Atiny polyp versus retention cyst is seen within the left maxillary   sinus. Otherwise, the paranasal sinuses and tympanomastoid cavities are   clear. The calvarium is intact. A left-sided frontal scalp vertex   subgaleal lipoma is seen.  MRI ORBITS: The bilateral orbits inclusive of the globes, extraocular   muscles, optic nerves, and orbital fat appear unremarkable. No abnormal   orbital enhancement is seen. The lacrimal glands appear unremarkable. The   optic chiasm appears within normal limits.  The cavernous sinuses enhance symmetrically with contrast.  The Meckel's caves appear unremarkable.  IMPRESSION:  MRI BRAIN:  1. No acute intracranial hemorrhage, acute ischemia, or abnormal   intracranial enhancement.  2. Multiple small rounded nonspecific abnormal white matter foci of   T2/FLAIR prolongation statistically favoring microvascular type changes.  MRI ORBITS:  1. Unremarkable study.

## 2025-05-07 NOTE — CONSULT NOTE ADULT - ASSESSMENT
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 Ophthalmology consulted for further evaluation.     #Binocular Diplopia     - Findings and plan discussed with patient and primary team.    Patient seen and discussed with  ***    Outpatient follow-up: Patient should follow-up with his/her ophthalmologist or with [facility] Department of Ophthalmology at the address below     [address] [place] [address]. 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 Ophthalmology consulted for further evaluation.     #Binocular Diplopia   - Pt with verticular diplopia for 1 week. Last night, 5/6 diplopia worsened while at work and patient was unable to read computer. Diplopia resolves with eye cover.   - IOP wnl, color plates full, no APD   - CTA/CT unremarkable    - MR:  No acute intracranial hemorrhage, acute ischemia, or abnormal intracranial enhancement;  Multiple small rounded nonspecific abnormal white matter foci of T2/FLAIR prolongation statistically favoring microvascular type changes.    - Findings and plan discussed with patient and primary team.    Patient seen and discussed with  ***    Outpatient follow-up: Patient should follow-up with his/her ophthalmologist or with [facility] Department of Ophthalmology at the address below     [address] [place] [address]. [address]  [place] [address]  [phone]     Assessment and Recommendations:  60 y/o M PMH chronic Hep B, presented to 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 Ophthalmology consulted for further evaluation.     #Likely Skew Deviation OD  - Pt with verticular diplopia for 1 week. Last night, 5/6 diplopia worsened while at work and patient was unable to read computer. Diplopia resolves with eye cover.   - VA 20/20 OU, IOP wnl, color plates full OU, PERRL OU with no APD  - On assessment of EOMs, noted to have right hypertropia on primary gaze with + vertical diplopia worse on left head tilt and left head turn (right gaze). EOMs otherwise full.    - CTA/CTH unremarkable    - MR:  No acute intracranial hemorrhage, acute ischemia, or abnormal intracranial enhancement;  Multiple small rounded nonspecific abnormal white matter foci of T2/FLAIR prolongation statistically favoring microvascular type changes  - Pattern of vertical diplopia elicited on exam inconsistent with diagnosis of CN 4 palsy, as patient with right hypertropia and diplopia is worse on contralateral head tilt and ipsilateral gaze. Suspect possible skew deviation.   - Given normal neuroimaging, patient is clear from ophthalmic standpoint for discharge home with outpatient neuro-ophthalmology follow-up. Will arrange for follow-up.      Patient seen and discussed with Dr. Hammond, consult attending    Outpatient follow-up: Patient should follow-up with his/her ophthalmologist or with [facility] Department of Ophthalmology at the address below     [address] [place] [address]. 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 Ophthalmology consulted for further evaluation.     #Likely Skew Deviation OD  - Pt with verticular diplopia for 1 week. Last night, 5/6 diplopia worsened while at work and patient was unable to read computer. Diplopia resolves with eye cover.   - VA 20/20 OU, IOP wnl, color plates full OU, PERRL OU with no APD  - On assessment of EOMs, noted to have right hypertropia on primary gaze with + vertical diplopia worse on left head tilt and left head turn (right gaze). EOMs otherwise full.    - CTA/CTH unremarkable    - MR:  No acute intracranial hemorrhage, acute ischemia, or abnormal intracranial enhancement;  Multiple small rounded nonspecific abnormal white matter foci of T2/FLAIR prolongation statistically favoring microvascular type changes  - Pattern of vertical diplopia elicited on exam inconsistent with diagnosis of CN 4 palsy, as patient with right hypertropia and diplopia is worse on contralateral head tilt and ipsilateral gaze. Suspect possible skew deviation vs decompensated hyperphoria.   - Given normal neuroimaging, patient is clear from ophthalmic standpoint for discharge home with outpatient neuro-ophthalmology follow-up. Will arrange for follow-up.      Patient seen and discussed with Dr. Hammond, consult attending    Outpatient follow-up: Patient should follow-up with his/her ophthalmologist or with [facility] Department of Ophthalmology at the address below     [address] [place] [address]. [address]  [place] [address]  [phone]

## 2025-05-07 NOTE — PROGRESS NOTE ADULT - SUBJECTIVE AND OBJECTIVE BOX
****Jez Harman Internal Medicine PGY-2*****    CHIEF COMPLAINT:    Overnight Events:  Interval Events:    OBJECTIVE:  ICU Vital Signs Last 24 Hrs  T(C): 36.8 (07 May 2025 04:00), Max: 36.8 (06 May 2025 21:30)  T(F): 98.2 (07 May 2025 04:00), Max: 98.2 (06 May 2025 21:30)  HR: 56 (07 May 2025 07:00) (56 - 90)  BP: 124/84 (07 May 2025 07:00) (102/76 - 147/103)  BP(mean): 100 (07 May 2025 07:00) (84 - 105)  ABP: --  ABP(mean): --  RR: 15 (07 May 2025 07:00) (14 - 28)  SpO2: 96% (07 May 2025 07:00) (93% - 100%)    O2 Parameters below as of 07 May 2025 00:16  Patient On (Oxygen Delivery Method): room air              CAPILLARY BLOOD GLUCOSE          PHYSICAL EXAM  General:   Head:    Eyes:   Neck:   Cardiac:   Lungs:   Abdomen:   Extremities:   Neuro  Psych:   Skin:  Etc:      HOSPITAL MEDICATIONS:  MEDICATIONS  (STANDING):  chlorhexidine 2% Cloths 1 Application(s) Topical <User Schedule>  enoxaparin Injectable 40 milliGRAM(s) SubCutaneous every 24 hours  tenofovir alafenamide (VEMLIDY) 25 milliGRAM(s) Oral daily    MEDICATIONS  (PRN):      LABS:                        16.5   9.18  )-----------( 257      ( 06 May 2025 21:14 )             49.0     Hgb Trend: 16.5<--  05-06    137  |  100  |  11  ----------------------------<  170[H]  3.7   |  25  |  0.78    Ca    9.2      06 May 2025 21:14    TPro  7.2  /  Alb  4.2  /  TBili  0.7  /  DBili  x   /  AST  17  /  ALT  21  /  AlkPhos  66  05-06    Creatinine Trend: 0.78<--  PT/INR - ( 06 May 2025 21:14 )   PT: 12.2 sec;   INR: 1.07 ratio         PTT - ( 06 May 2025 21:14 )  PTT:33.3 sec  Urinalysis Basic - ( 06 May 2025 21:14 )    Color: x / Appearance: x / SG: x / pH: x  Gluc: 170 mg/dL / Ketone: x  / Bili: x / Urobili: x   Blood: x / Protein: x / Nitrite: x   Leuk Esterase: x / RBC: x / WBC x   Sq Epi: x / Non Sq Epi: x / Bacteria: x        Venous Blood Gas:  05-06 @ 21:14  7.43/52/23/34/36.3  VBG Lactate: 1.3      MICROBIOLOGY:       RADIOLOGY:  [ ] Reviewed by me

## 2025-05-07 NOTE — DISCHARGE NOTE PROVIDER - PROVIDER TOKENS
PROVIDER:[TOKEN:[[id number]],FOLLOWUP:[1 week]],PROVIDER:[TOKEN:[[id number]],FOLLOWUP:[1 week]]

## 2025-05-07 NOTE — PATIENT PROFILE ADULT - HOME ACCESSIBILITY CONCERNS
INPATIENT NEUROPSYCHOLOGY/REHAB PSYCHOLOGY    CPT: 64946    Session Content:  Mrs. Jannie Mulligan (\"the patient\") is a pleasant, 86-year-old woman who presented to [facility] from [facility] on 08/30/2024 with hypoxia/respiratory failure and confusion. Hospitalization has been complicated by whole body myoclonic jerks, NSTEMI type II secondary to ARF and acute on chronic HFpEF, and UTI. She has been receiving a course of intensive rehabilitation therapies since 09/06/2024. Neuropsychology was asked to meet with the patient to provide support.    Met with the patient and her son this afternoon. Patient was resting in wheelchair eating food and son was seated nearby. The patient reported that she was feeling \"terrible.\" After an explanation of this provider's role and the purpose of the visit, the patient agreed to participate. Discussed with patient her recent respiratory failure as well as her progress in therapies thus far. The patient reported benefit from therapies and indicated that she generally feels well taken care of. However, she did report that she at times feels ignored during the night, as staff is reportedly quick to leave after they address her needs. The patient indicated that she understands that there are other patients that staff needs to care for, though it still remains bothersome to her.    Discussed with the patient and her son the patient's recent experiences of visual hallucinations. Son indicated that these began during a previous recent hospitalization for UTI and had largely resolved (though son noted that urine has not been checked recently), though they have reportedly begun again. The patient reported seeing people standing in her room when she is going to sleep or if she wakes during the night. She has good insight into the fact that they are hallucinations and will also check (tries to speak to them to see if they respond) to ensure. She denied any distress. Psychoeducation was provided  regarding hallucinations in the context of UTI as well as hospitalization; the patient was encouraged to let staff know immediately should she begin to experience any hallucinations during daytime hours.    Interventions:  HB Assessment  Psychoeducation    Impressions:  Dementia (per history)  Acute hypoxic on chronic hypercapnic respiratory failure  Adjustment disorder with depressed mood    Plan:  Will follow patient while she is on 6W in order to provide support. She and her son were given this provider's contact information and were encouraged to reach out with any questions/concerns/requests.      Nima Fuentes PsyD  Clinical Neuropsychologist   none

## 2025-05-07 NOTE — DISCHARGE NOTE PROVIDER - CARE PROVIDERS DIRECT ADDRESSES
[email or web address]

## 2025-05-07 NOTE — CONSULT NOTE ADULT - ATTENDING COMMENTS
DOS 5/7  seen in ICU  Charlenecornel Calixto is 58yo M w chronic Hep B, comes in with double vision for 1 week, worsening for last 2 hours. Double vision is with both eyes open, resolves with either eyes closed. Sees images one above the other. No blurry vision. 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 He denies any speech changes, numbness or weakness. He denies any headache, eye pain but has mild neck strain for last 2 hours. He denies any room spinning dizziness. Denies any head or neck trauma.   On exam, VSS, right eye hypertropic, diplopia worsens on left gaze and right head tilt (RSO paresis), gait unsteady with both eyes open, normal with one eye closed, rest of the exam unremarkable.   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 Etiology could be microvascular ; doubt stroke     Recs:    - eye patch  - f/u optho; should neuro optho outpatient. Dr Boss or Dr. Hammond   - ESR/CRP  - no need for asa/statin from stroke standpoint   - check lipid panel    - TTE  - telemetry  - PT/OT/SS/SLP, OOBC  - check FS, glucose control <180  - GI/DVT ppx  - Counseling on diet, exercise, and medication adherence was done  - Counseling on smoking cessation and alcohol consumption offered when appropriate.  - Pain assessed and judicious use of narcotics when appropriate was discussed.    - Stroke education given when appropriate.  - Importance of fall prevention discussed.   - Differential diagnosis and plan of care discussed with patient and/or family and primary team  - Thank you for allowing me to participate in the care of this patient. Call with questions.    spoke with ICU team   Marlo Walker MD  Vascular Neurology  Office: [phone]
Agree with assessment and plan as above

## 2025-05-07 NOTE — PATIENT PROFILE ADULT - FUNCTIONAL ASSESSMENT - BASIC MOBILITY SECTION LABEL
. Dispo: Pending CT surgery  Diet: Regular  DVT: Lovenox, SCDs   CODE: FULL  Communication: Primarily with patient, daughter updated at bedside 11/16 Dispo: Pending CT surgery  Diet: Regular  DVT: Lovenox, SCDs   CODE: FULL  Communication: Primarily with patient, daughter updated at bedside 11/16

## 2025-05-07 NOTE — DISCHARGE NOTE NURSING/CASE MANAGEMENT/SOCIAL WORK - FINANCIAL ASSISTANCE
[facility] provides services at a reduced cost to those who are determined to be eligible through [facility]’s financial assistance program. Information regarding [facility]’s financial assistance program can be found by going to [email or web address] or by calling [phone].

## 2025-05-07 NOTE — PROGRESS NOTE ADULT - ATTENDING COMMENTS
59M with PMHx of chronic hepatitis on Vemlidy, with one week of worsening diplopia, with few hours of worsening double vision, admitted to the hospital after code stroke.    #Neuro:  #CV: check lipid panel   #Pulm:   #ID:   #Renal/Fluid:   #Heme:   #Endo: A1c 5.8  #GI:   # Skin/Lines  #DVT ppx:   #Dispo/Ethics:     *incomplete* 59M with PMHx of chronic hepatitis on Vemlidy, with one week of worsening diplopia, with few hours of worsening double vision, admitted to the hospital after code stroke.    #Neuro: binocular diplopia- CTA head and neck and MRI head without acute intracranial pathology including acute stroke, acute vascular disease, demyelinating disease  or evidence of mass. on exam, patient with left gaze nystagmus. neuro and ophtho recs pending.   #CV: check lipid panel. BPs stable  #Pulm: no acute issues  #ID:   #Renal/Fluid:   #Heme: H/H stable  #Endo: A1c 5.8  #GI: tolerating PO  # Skin/Lines: peripheral IVS  #DVT ppx: ambulatory  #Dispo/Ethics: Full Code

## 2025-05-07 NOTE — DISCHARGE NOTE PROVIDER - NSDCCPCAREPLAN_GEN_ALL_CORE_FT
PRINCIPAL DISCHARGE DIAGNOSIS  Diagnosis: Double vision  Assessment and Plan of Treatment: You were evaluated for new-onset double vision (diplopia) and imbalance. 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 . Both CT and MRI scans of your brain showed no acute abnormalities.   General Advice:   Consider using an eye patch over one eye to alleviate double vision, especially during activities like reading or walking.   Avoid driving or operating heavy machinery until your vision stabilizes.   Monitor for any new or worsening symptoms, such as increased imbalance, facial numbness, or changes in vision, and seek immediate medical attention if they occur.   Most cases of isolated cranial nerve IV palsy improve within 3 to 6 months. Regular follow-up appointments will help track your progress and determine if further interventions are necessary.   Recommendations:   Ophthalmology Follow-up: Please schedule an appointment with Dr. Richie Malagon MD, or Dr. Anai Hammond MD

## 2025-05-07 NOTE — DISCHARGE NOTE PROVIDER - HOSPITAL COURSE
58 y/o M PMH chronic Hep B, presented to [facility] c/o intermittent diplopia x 1 week, worsening tonight x 2 hours associated with feeling unsteady with walking and difficult focusing on computer screen. His double vision resolves with closing one eye, described as seeing images one above the other. He denies any fever, speech changes, numbness, weakness, headache, eye pain, tick bites, rashes, vomiting, or vertigo.     In the ED code stroke was activated, he was NIHSS 1 for R eye gaze paresis noted on exam, suspected CN IV palsy, CTA H/N unremarkable.       Hospital Course: Patient admitted, was stable. CT head and orbitals + MRI with no findings. Opthalmology came and evaluated with no concerns. Neurology w/out concerns as well.     Discharged with follow up recommendations 60 y/o M PMH chronic Hep B, presented to [facility] c/o intermittent diplopia x 1 week, worsening tonight x 2 hours associated with feeling unsteady with walking and difficult focusing on computer screen. His double vision resolves with closing one eye, described as seeing images one above the other. He denies any fever, speech changes, numbness, weakness, headache, eye pain, tick bites, rashes, vomiting, or vertigo.     In the ED code stroke was activated, he was NIHSS 1 for R eye gaze paresis noted on exam, suspected CN IV palsy, CTA H/N unremarkable.     Hospital Course: Patient admitted, was stable. CT head and orbitals + MRI with no findings. Opthalmology came and evaluated with no concerns. Neurology w/out concerns as well. Likely etiology is cranial nerve palsy.    Discharged with follow up recommendations

## 2025-05-07 NOTE — DISCHARGE NOTE PROVIDER - CARE PROVIDER_API CALL
Richie Malagon  Ophthalmology  [address], [address]  [place] [address]  Phone: [phone]  Fax: [phone]  Follow Up Time: 1 week    Anai Hammond  Ophthalmology  [address], [address]  [place] [address]  Phone: [phone]  Fax: [phone]  Follow Up Time: 1 week

## 2025-05-07 NOTE — PROGRESS NOTE ADULT - ASSESSMENT
58 y/o M PMH chronic Hep B, presented to [facility] c/o intermittent diplopia x 1 week, worsening night of admission a/w difficulty walking, found to have CN IV palsy on exam, NIHSS 1, pending MRI.    ====Neurological====  #Diplopia  #CN IV palsy  -Intermittent/fluctuating diplopia x 1 week, improves with covering one eye, no pain. NIHSS 1 in ED for CN IV palsy on neuro exam.   -CTA H/N unremarkable: No flow limiting stenosis, no aneurysm identified.  -Passed bedside dysphagia screen  Plan: MRI brain w and wo contrast, consider antithrombotics based on MR results             MRI orbits w and wo contrast             TTE with bubble study to eval for intracardiac shunt             PT/OT             Eye patch                      ====Respiratory====  -Saturating well on RA  No active issues.    ====Cardiovascular====  -Trop negative, EKG: sinus rhythm, QTc normal  Plan: BP goal <140/90             f/u Lipid panel    ====Renal====  -Cr 0.78  No active issues.    ====GI====  #Chronic Hepatitis B  -Home med: Vemlidy 25mg  -LFT's wnl, prior screening MRI's negative for any suspicious lesions.  Plan: c/w home Vemlidy    ====Endocrine====  -Glucose wnl  Plan: monitor glucose on BMP             Goal 140-180 inpatient             f/u Hb A1c    ====Heme====  -CBC wnl   Plan: DVT ppx Lovenox 40mg Sq daily             Monitor CBC    ====ID====  -Afebrile, VSS, WBC wnl, no concerns for active infection.     ====Lines====  -PIV    ====GOC====  -Full Code

## 2025-05-07 NOTE — DISCHARGE NOTE NURSING/CASE MANAGEMENT/SOCIAL WORK - PATIENT PORTAL LINK FT
You can access the FollowMyHealth Patient Portal offered by [facility] by registering at the following website: [email or web address]. By joining [facility]’s FollowMyHealth portal, you will also be able to view your health information using other applications (apps) compatible with our system.

## 2025-05-07 NOTE — DISCHARGE NOTE PROVIDER - NSFOLLOWUPCLINICS_GEN_ALL_ED_FT
[facility]  Neurology  [address] [place] [address] [address] [place] [address]  Phone: [phone]  Fax:   Follow Up Time: 1 week

## 2025-05-07 NOTE — CONSULT NOTE ADULT - SUBJECTIVE AND OBJECTIVE BOX
[facility] DEPARTMENT OF OPHTHALMOLOGY - INITIAL ADULT CONSULT  ----------------------------------------------------------------------------------------------------  Shabana Ceballos MD, PGY-3  Esther Szymanski, PGY-1  -------------------------------------------------------------------------------------------------    HPI:  58 y/o M PMH chronic Hep B, presented to [facility] c/o intermittent diplopia x 1 week, worsening tonight x 2 hours associated with feeling unsteady with walking and difficult focusing on computer screen. His double vision resolves with closing one eye, described as seeing images one above the other. He denies any fever, speech changes, numbness, weakness, headache, eye pain, tick bites, rashes, vomiting, or vertigo.     In the ED code stroke was activated, he was NIHSS 1 for R eye gaze paresis noted on exam, suspected CN IV palsy, CTA H/N unremarkable.      (06 May 2025 23:59)    Interval History: ***    PAST MEDICAL & SURGICAL HISTORY:  Chronic hepatitis B        Past Ocular History: ***  Ophthalmic Medications: ***  FAMILY HISTORY:    Social History: ***    MEDICATIONS  (STANDING):  chlorhexidine 2% Cloths 1 Application(s) Topical <User Schedule>  tenofovir alafenamide (VEMLIDY) 25 milliGRAM(s) Oral daily    MEDICATIONS  (PRN):    Allergies & Intolerances:   No Known Allergies    Review of Systems:  Constitutional: No fever, chills  Eyes: No blurry vision, flashes, floaters, FBS, erythema, discharge, double vision, OU  Neuro: No tremors  Cardiovascular: No chest pain, palpitations  Respiratory: No SOB, no cough  GI: No nausea, vomiting, abdominal pain  : No dysuria  Skin: no rash  Psych: no depression  Endocrine: no polyuria, polydipsia  Heme/lymph: no swelling    VITALS: T(C): 36.7 (05-07-25 @ 08:00)  T(F): 98.1 (05-07-25 @ 08:00), Max: 98.2 (05-06-25 @ 21:30)  HR: 55 (05-07-25 @ 08:00) (55 - 90)  BP: 127/85 (05-07-25 @ 08:00) (102/76 - 147/103)  RR:  (14 - 28)  SpO2:  (93% - 100%)  Wt(kg): --  General: AAO x 3, appropriate mood and affect    Ophthalmology Exam:  Visual acuity (sc): 20/20 OU  Pupils: PERRL OU, no APD  Ttono: 16 OU  Extraocular movements (EOMs): Full OU, no pain, no diplopia  Confrontational Visual Field (CVF): Full OU  Color Plates: 12/12 OU    Pen Light Exam (PLE)  External: Flat OU  Lids/Lashes/Lacrimal Ducts: Flat OU    Sclera/Conjunctiva: W+Q OU  Cornea: Cl OU  Anterior Chamber: D+F OU    Iris: Flat OU  Lens: Cl OU    Fundus Exam: dilated with 1% tropicamide and 2.5% phenylephrine  Approval obtained from primary team for dilation  Patient aware that pupils can remained dilated for at least 4-6 hours  Exam performed with 20D lens    Vitreous: wnl OU  Disc, cup/disc: sharp and pink, 0.4 OU  Macula: wnl OU  Vessels: wnl OU  Periphery: wnl OU    < from: MR Orbits w/wo IV Cont (05.07.25 @ 01:47) >  IMPRESSION:    MRI BRAIN:  1. No acute intracranial hemorrhage, acute ischemia, or abnormal   intracranial enhancement.  2. Multiple small rounded nonspecific abnormal white matter foci of   T2/FLAIR prolongation statistically favoring microvascular type changes.    MRI ORBITS:  1. Unremarkable study.    --- End of Report ---    < end of copied text >  < from: CT Brain Stroke Protocol (05.06.25 @ 21:27) >    IMPRESSION:  No acute intracranial hemorrhage, mass effect, or evidence of acute   vascular territorial infarction. If clinical symptoms persist or worsen,   more sensitiveevaluation with brain MRI may be obtained, if no   contraindications exist.    < end of copied text >  < from: CT Angio Neck Stroke Protocol w/ IV Cont (05.06.25 @ 21:27) >    IMPRESSION:    CTA NECK:  No evidence of significant stenosis or occlusion.    CTA HEAD:  Patent intracranial circulation without flow limiting stenosis.  No evidence of aneurysm. Tiny aneurysms can be beyond the resolution of   CTA technique.      < end of copied text >         [facility] DEPARTMENT OF OPHTHALMOLOGY - INITIAL ADULT CONSULT  ----------------------------------------------------------------------------------------------------  Shabana Ceballos MD, PGY-3  Esther Szymanski, PGY-1  -------------------------------------------------------------------------------------------------    HPI:  60 y/o M PMH chronic Hep B, presented to [facility] c/o intermittent diplopia x 1 week, worsening last night x 2 hours associated with feeling unsteady with walking and difficult focusing on computer screen. His double vision resolves with closing one eye, described as seeing images one above the other.  He denies any fever, speech changes, numbness, weakness, headache, eye pain, tick bites, rashes, vomiting, or vertigo.     In the ED code stroke was activated, he was NIHSS 1 for R eye gaze paresis noted on exam, suspected CN IV palsy, CTA H/N unremarkable.      (06 May 2025 23:59)    Interval History: Ophthalmology consulted.     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[facility]     MEDICATIONS  (STANDING):  chlorhexidine 2% Cloths 1 Application(s) Topical <User Schedule>  tenofovir alafenamide (VEMLIDY) 25 milliGRAM(s) Oral daily    MEDICATIONS  (PRN):    Allergies & Intolerances:   No Known Allergies    Review of Systems:  Constitutional: No fever, chills  Eyes: No blurry vision, flashes, floaters, FBS, erythema, discharge,  + double vision  Neuro: No tremors  Cardiovascular: No chest pain, palpitations  Respiratory: No SOB, no cough  GI: No nausea, vomiting, abdominal pain  : No dysuria  Skin: no rash  Psych: no depression  Endocrine: no polyuria, polydipsia  Heme/lymph: no swelling    VITALS: T(C): 36.7 (05-07-25 @ 08:00)  T(F): 98.1 (05-07-25 @ 08:00), Max: 98.2 (05-06-25 @ 21:30)  HR: 55 (05-07-25 @ 08:00) (55 - 90)  BP: 127/85 (05-07-25 @ 08:00) (102/76 - 147/103)  RR:  (14 - 28)  SpO2:  (93% - 100%)  Wt(kg): --  General: AAO x 3, appropriate mood and affect    Ophthalmology Exam:  Visual acuity (sc): 20/20 OU  Pupils: PERRL OU, no APD  Ttono: 16 OD 11 OS   Extraocular movements (EOMs): Full OU, diplopia with all EOM  Confrontational Visual Field (CVF): Full OU  Color Plates: 12/12 OU    Pen Light Exam (PLE)  External: Flat OU  Lids/Lashes/Lacrimal Ducts: Flat OU    Sclera/Conjunctiva: W+Q OU  Cornea: Cl OU  Anterior Chamber: D+F OU    Iris: Flat OU  Lens: Cl OU    Fundus Exam: dilated with 1% tropicamide and 2.5% phenylephrine  Approval obtained from primary team for dilation  Patient aware that pupils can remained dilated for at least 4-6 hours  Exam performed with 20D lens    Vitreous: wnl OU  Disc, cup/disc: sharp and pink, 0.4 OU  Macula: wnl OU  Vessels: wnl OU  Periphery: wnl OU    < from: MR Orbits w/wo IV Cont (05.07.25 @ 01:47) >  IMPRESSION:    MRI BRAIN:  1. No acute intracranial hemorrhage, acute ischemia, or abnormal   intracranial enhancement.  2. Multiple small rounded nonspecific abnormal white matter foci of   T2/FLAIR prolongation statistically favoring microvascular type changes.    MRI ORBITS:  1. Unremarkable study.    --- End of Report ---    < end of copied text >  < from: CT Brain Stroke Protocol (05.06.25 @ 21:27) >    IMPRESSION:  No acute intracranial hemorrhage, mass effect, or evidence of acute   vascular territorial infarction. If clinical symptoms persist or worsen,   more sensitiveevaluation with brain MRI may be obtained, if no   contraindications exist.    < end of copied text >  < from: CT Angio Neck Stroke Protocol w/ IV Cont (05.06.25 @ 21:27) >    IMPRESSION:    CTA NECK:  No evidence of significant stenosis or occlusion.    CTA HEAD:  Patent intracranial circulation without flow limiting stenosis.  No evidence of aneurysm. Tiny aneurysms can be beyond the resolution of   CTA technique.      < end of copied text >         [facility] DEPARTMENT OF OPHTHALMOLOGY - INITIAL ADULT CONSULT  ----------------------------------------------------------------------------------------------------  Shabana Ceballos MD, PGY-3  Esther Szymanski, PGY-1  -------------------------------------------------------------------------------------------------    HPI:  60 y/o M PMH chronic Hep B, presented to [facility] c/o intermittent diplopia x 1 week, worsening last night x 2 hours associated with feeling unsteady with walking and difficult focusing on computer screen. His double vision resolves with closing one eye, described as seeing images one above the other.  He denies any fever, speech changes, numbness, weakness, headache, eye pain, tick bites, rashes, vomiting, or vertigo.     In the ED code stroke was activated, he was NIHSS 1 for R eye gaze paresis noted on exam, suspected CN IV palsy, CTA H/N unremarkable.      (06 May 2025 23:59)    Interval History: Patient reports 1 week of intermittent vertical diplopia that he was able to control. Reports that yesterday evening he developed abrupt worsening of diplopia and was no longer able to control it. Denies headache, eye pain, and change in vision.    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[facility]     MEDICATIONS  (STANDING):  chlorhexidine 2% Cloths 1 Application(s) Topical <User Schedule>  tenofovir alafenamide (VEMLIDY) 25 milliGRAM(s) Oral daily    MEDICATIONS  (PRN):    Allergies & Intolerances:   No Known Allergies    Review of Systems:  Constitutional: No fever, chills  Eyes: No blurry vision, flashes, floaters, FBS, erythema, discharge,  + double vision  Neuro: No tremors  Cardiovascular: No chest pain, palpitations  Respiratory: No SOB, no cough  GI: No nausea, vomiting, abdominal pain  : No dysuria  Skin: no rash  Psych: no depression  Endocrine: no polyuria, polydipsia  Heme/lymph: no swelling    VITALS: T(C): 36.7 (05-07-25 @ 08:00)  T(F): 98.1 (05-07-25 @ 08:00), Max: 98.2 (05-06-25 @ 21:30)  HR: 55 (05-07-25 @ 08:00) (55 - 90)  BP: 127/85 (05-07-25 @ 08:00) (102/76 - 147/103)  RR:  (14 - 28)  SpO2:  (93% - 100%)  Wt(kg): --  General: AAO x 3, appropriate mood and affect    Ophthalmology Exam:  Visual acuity (sc): 20/20 OU  Pupils: PERRL OU, no APD  Ttono: 16 OD 11 OS   Extraocular movements (EOMs): Full OU, R-hypertropia on primary gaze. + vertical diplopia worse on left head tilt and right gaze.   Confrontational Visual Field (CVF): Full OU  Color Plates: 12/12 OU    Pen Light Exam (PLE)  External: Flat OU  Lids/Lashes/Lacrimal Ducts: Flat OU    Sclera/Conjunctiva: W+Q OU  Cornea: Cl OU  Anterior Chamber: D+F OU    Iris: Flat OU  Lens: Cl OU    Fundus Exam: dilated with 1% tropicamide and 2.5% phenylephrine  Approval obtained from primary team for dilation  Patient aware that pupils can remained dilated for at least 4-6 hours  Exam performed with 20D lens    Vitreous: wnl OU  Disc, cup/disc: sharp and pink, 0.4 OU  Macula: wnl OU  Vessels: wnl OU  Periphery: wnl OU    < from: MR Orbits w/wo IV Cont (05.07.25 @ 01:47) >  IMPRESSION:    MRI BRAIN:  1. No acute intracranial hemorrhage, acute ischemia, or abnormal   intracranial enhancement.  2. Multiple small rounded nonspecific abnormal white matter foci of   T2/FLAIR prolongation statistically favoring microvascular type changes.    MRI ORBITS:  1. Unremarkable study.    --- End of Report ---    < end of copied text >  < from: CT Brain Stroke Protocol (05.06.25 @ 21:27) >    IMPRESSION:  No acute intracranial hemorrhage, mass effect, or evidence of acute   vascular territorial infarction. If clinical symptoms persist or worsen,   more sensitiveevaluation with brain MRI may be obtained, if no   contraindications exist.    < end of copied text >  < from: CT Angio Neck Stroke Protocol w/ IV Cont (05.06.25 @ 21:27) >    IMPRESSION:    CTA NECK:  No evidence of significant stenosis or occlusion.    CTA HEAD:  Patent intracranial circulation without flow limiting stenosis.  No evidence of aneurysm. Tiny aneurysms can be beyond the resolution of   CTA technique.      < end of copied text >

## 2025-05-09 PROBLEM — B18.1 CHRONIC VIRAL HEPATITIS B WITHOUT DELTA-AGENT: Chronic | Status: ACTIVE | Noted: 2025-05-07

## 2025-06-03 ENCOUNTER — NON-APPOINTMENT (OUTPATIENT)
Age: 60
End: 2025-06-03

## 2025-06-03 ENCOUNTER — APPOINTMENT (OUTPATIENT)
Dept: OPHTHALMOLOGY | Facility: CLINIC | Age: 60
End: 2025-06-03
Payer: COMMERCIAL

## 2025-06-03 PROCEDURE — 99204 OFFICE O/P NEW MOD 45 MIN: CPT

## 2025-06-03 PROCEDURE — 92060 SENSORIMOTOR EXAMINATION: CPT

## 2025-06-03 PROCEDURE — V2718: CPT

## 2025-06-17 ENCOUNTER — TRANSCRIPTION ENCOUNTER (OUTPATIENT)
Age: 60
End: 2025-06-17

## 2025-07-16 ENCOUNTER — APPOINTMENT (OUTPATIENT)
Dept: OPHTHALMOLOGY | Facility: CLINIC | Age: 60
End: 2025-07-16
Payer: COMMERCIAL

## 2025-07-16 ENCOUNTER — NON-APPOINTMENT (OUTPATIENT)
Age: 60
End: 2025-07-16

## 2025-07-16 PROCEDURE — 92012 INTRM OPH EXAM EST PATIENT: CPT

## 2025-07-16 PROCEDURE — 92060 SENSORIMOTOR EXAMINATION: CPT

## 2025-09-03 ENCOUNTER — APPOINTMENT (OUTPATIENT)
Dept: OPHTHALMOLOGY | Facility: CLINIC | Age: 60
End: 2025-09-03
Payer: COMMERCIAL

## 2025-09-03 ENCOUNTER — NON-APPOINTMENT (OUTPATIENT)
Age: 60
End: 2025-09-03

## 2025-09-03 PROCEDURE — 92012 INTRM OPH EXAM EST PATIENT: CPT

## 2025-09-03 PROCEDURE — 92060 SENSORIMOTOR EXAMINATION: CPT

## 2025-09-03 PROCEDURE — 92015 DETERMINE REFRACTIVE STATE: CPT

## (undated) DEVICE — FORCEP RADIAL JAW 4 W NDL 2.2MM 2.8MM 240CM ORANGE DISP